# Patient Record
Sex: FEMALE | Race: WHITE | Employment: UNEMPLOYED | URBAN - METROPOLITAN AREA
[De-identification: names, ages, dates, MRNs, and addresses within clinical notes are randomized per-mention and may not be internally consistent; named-entity substitution may affect disease eponyms.]

---

## 2020-06-05 ENCOUNTER — APPOINTMENT (OUTPATIENT)
Dept: CT IMAGING | Age: 70
End: 2020-06-05
Payer: OTHER MISCELLANEOUS

## 2020-06-05 ENCOUNTER — HOSPITAL ENCOUNTER (EMERGENCY)
Age: 70
Discharge: ANOTHER HOSPITAL | End: 2020-06-05
Payer: OTHER MISCELLANEOUS

## 2020-06-05 VITALS
RESPIRATION RATE: 18 BRPM | TEMPERATURE: 98 F | HEART RATE: 109 BPM | SYSTOLIC BLOOD PRESSURE: 95 MMHG | OXYGEN SATURATION: 100 % | DIASTOLIC BLOOD PRESSURE: 61 MMHG

## 2020-06-05 PROCEDURE — 2500000003 HC RX 250 WO HCPCS: Performed by: EMERGENCY MEDICINE

## 2020-06-05 PROCEDURE — 96375 TX/PRO/DX INJ NEW DRUG ADDON: CPT

## 2020-06-05 PROCEDURE — 74177 CT ABD & PELVIS W/CONTRAST: CPT

## 2020-06-05 PROCEDURE — 72128 CT CHEST SPINE W/O DYE: CPT

## 2020-06-05 PROCEDURE — 70496 CT ANGIOGRAPHY HEAD: CPT

## 2020-06-05 PROCEDURE — 71275 CT ANGIOGRAPHY CHEST: CPT

## 2020-06-05 PROCEDURE — 70486 CT MAXILLOFACIAL W/O DYE: CPT

## 2020-06-05 PROCEDURE — 72131 CT LUMBAR SPINE W/O DYE: CPT

## 2020-06-05 PROCEDURE — 70450 CT HEAD/BRAIN W/O DYE: CPT

## 2020-06-05 PROCEDURE — 2580000003 HC RX 258

## 2020-06-05 PROCEDURE — 2500000003 HC RX 250 WO HCPCS

## 2020-06-05 PROCEDURE — 99291 CRITICAL CARE FIRST HOUR: CPT

## 2020-06-05 PROCEDURE — 6360000004 HC RX CONTRAST MEDICATION: Performed by: EMERGENCY MEDICINE

## 2020-06-05 PROCEDURE — 72125 CT NECK SPINE W/O DYE: CPT

## 2020-06-05 PROCEDURE — 96374 THER/PROPH/DIAG INJ IV PUSH: CPT

## 2020-06-05 RX ORDER — ROCURONIUM BROMIDE 10 MG/ML
INJECTION, SOLUTION INTRAVENOUS DAILY PRN
Status: COMPLETED | OUTPATIENT
Start: 2020-06-05 | End: 2020-06-05

## 2020-06-05 RX ORDER — 0.9 % SODIUM CHLORIDE 0.9 %
1000 INTRAVENOUS SOLUTION INTRAVENOUS ONCE
Status: DISCONTINUED | OUTPATIENT
Start: 2020-06-05 | End: 2020-06-05 | Stop reason: HOSPADM

## 2020-06-05 RX ORDER — ETOMIDATE 2 MG/ML
INJECTION INTRAVENOUS DAILY PRN
Status: COMPLETED | OUTPATIENT
Start: 2020-06-05 | End: 2020-06-05

## 2020-06-05 RX ADMIN — ROCURONIUM BROMIDE 70 MG: 10 SOLUTION INTRAVENOUS at 14:16

## 2020-06-05 RX ADMIN — IOPAMIDOL 150 ML: 755 INJECTION, SOLUTION INTRAVENOUS at 14:02

## 2020-06-05 RX ADMIN — ETOMIDATE 20 MG: 2 INJECTION, SOLUTION INTRAVENOUS at 14:15

## 2020-06-05 ASSESSMENT — PAIN DESCRIPTION - PAIN TYPE: TYPE: ACUTE PAIN

## 2020-06-05 ASSESSMENT — PAIN DESCRIPTION - LOCATION: LOCATION: CHEST

## 2020-06-05 ASSESSMENT — PAIN SCALES - GENERAL: PAINLEVEL_OUTOF10: 10

## 2020-06-05 NOTE — ED PROVIDER NOTES
was initiated. CT scans performed. I was in the imaging area all the scans were being done patient. Patient became hypoxic, dropped her oxygen saturations into the 70s. She was started on 6 L while in the scanner. Oxygenation came up to 95% on the 6 L. At the end of scan O2 dropped to the 80s. I called over the ED and spoke with Juana Vazquez to prepare for intubation. I did not see any large pneumothorax on CT images I reviewed. Care flight did arrive, patient was intubated and transferred to trauma center for further evaluation. Due to the immediate potential for life-threatening deterioration due to trauma, hypoxia, I spent 45 minutes providing critical care. This time is excluding time spent performing procedures.        Rogers Vilchis DO  06/05/20 1702
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
